# Patient Record
Sex: FEMALE | Race: WHITE | NOT HISPANIC OR LATINO | Employment: OTHER | URBAN - METROPOLITAN AREA
[De-identification: names, ages, dates, MRNs, and addresses within clinical notes are randomized per-mention and may not be internally consistent; named-entity substitution may affect disease eponyms.]

---

## 2017-01-27 ENCOUNTER — FOLLOW UP AND POST INJECTION EVALUATION (OUTPATIENT)
Dept: URBAN - METROPOLITAN AREA CLINIC 33 | Facility: CLINIC | Age: 77
End: 2017-01-27

## 2017-01-27 VITALS — HEIGHT: 55 IN | DIASTOLIC BLOOD PRESSURE: 91 MMHG | SYSTOLIC BLOOD PRESSURE: 128 MMHG | HEART RATE: 82 BPM

## 2017-01-27 DIAGNOSIS — H35.3221: ICD-10-CM

## 2017-01-27 DIAGNOSIS — H43.811: ICD-10-CM

## 2017-01-27 DIAGNOSIS — H04.123: ICD-10-CM

## 2017-01-27 DIAGNOSIS — H43.393: ICD-10-CM

## 2017-01-27 DIAGNOSIS — H26.492: ICD-10-CM

## 2017-01-27 DIAGNOSIS — H35.342: ICD-10-CM

## 2017-01-27 DIAGNOSIS — H02.836: ICD-10-CM

## 2017-01-27 DIAGNOSIS — H02.833: ICD-10-CM

## 2017-01-27 DIAGNOSIS — H35.3211: ICD-10-CM

## 2017-01-27 PROCEDURE — 92250 FUNDUS PHOTOGRAPHY W/I&R: CPT

## 2017-01-27 PROCEDURE — G8427 DOCREV CUR MEDS BY ELIG CLIN: HCPCS

## 2017-01-27 PROCEDURE — 4004F PT TOBACCO SCREEN RCVD TLK: CPT

## 2017-01-27 PROCEDURE — 2019F DILATED MACUL EXAM DONE: CPT

## 2017-01-27 PROCEDURE — G8420 CALC BMI NORM PARAMETERS: HCPCS

## 2017-01-27 PROCEDURE — 67028 INJECTION EYE DRUG: CPT

## 2017-01-27 PROCEDURE — 4177F TALK PT/CRGVR RE AREDS PREV: CPT

## 2017-01-27 PROCEDURE — 92014 COMPRE OPH EXAM EST PT 1/>: CPT

## 2017-01-27 ASSESSMENT — TONOMETRY
OD_IOP_MMHG: 14
OS_IOP_MMHG: 16

## 2017-01-27 ASSESSMENT — VISUAL ACUITY
OD_SC: 20/200-2
OS_SC: 20/60+2

## 2017-02-17 ENCOUNTER — CLINIC PROCEDURE ONLY (OUTPATIENT)
Dept: URBAN - METROPOLITAN AREA CLINIC 33 | Facility: CLINIC | Age: 77
End: 2017-02-17

## 2017-02-17 DIAGNOSIS — H35.3231: ICD-10-CM

## 2017-02-17 PROCEDURE — 67028 INJECTION EYE DRUG: CPT

## 2017-02-17 ASSESSMENT — TONOMETRY: OD_IOP_MMHG: 14

## 2017-02-17 ASSESSMENT — VISUAL ACUITY: OD_SC: 20/200-1

## 2017-03-10 ENCOUNTER — FOLLOW UP (OUTPATIENT)
Dept: URBAN - METROPOLITAN AREA CLINIC 33 | Facility: CLINIC | Age: 77
End: 2017-03-10

## 2017-03-10 VITALS — SYSTOLIC BLOOD PRESSURE: 124 MMHG | HEIGHT: 55 IN | DIASTOLIC BLOOD PRESSURE: 83 MMHG | HEART RATE: 69 BPM

## 2017-03-10 DIAGNOSIS — H02.833: ICD-10-CM

## 2017-03-10 DIAGNOSIS — H43.811: ICD-10-CM

## 2017-03-10 DIAGNOSIS — H35.342: ICD-10-CM

## 2017-03-10 DIAGNOSIS — H02.836: ICD-10-CM

## 2017-03-10 DIAGNOSIS — H04.123: ICD-10-CM

## 2017-03-10 DIAGNOSIS — H43.393: ICD-10-CM

## 2017-03-10 DIAGNOSIS — H35.3231: ICD-10-CM

## 2017-03-10 DIAGNOSIS — H26.492: ICD-10-CM

## 2017-03-10 PROCEDURE — 4004F PT TOBACCO SCREEN RCVD TLK: CPT

## 2017-03-10 PROCEDURE — 92014 COMPRE OPH EXAM EST PT 1/>: CPT

## 2017-03-10 PROCEDURE — 67028 INJECTION EYE DRUG: CPT

## 2017-03-10 PROCEDURE — 92250 FUNDUS PHOTOGRAPHY W/I&R: CPT

## 2017-03-10 PROCEDURE — 2019F DILATED MACUL EXAM DONE: CPT

## 2017-03-10 PROCEDURE — 4177F TALK PT/CRGVR RE AREDS PREV: CPT

## 2017-03-10 PROCEDURE — G8420 CALC BMI NORM PARAMETERS: HCPCS

## 2017-03-10 ASSESSMENT — VISUAL ACUITY
OS_SC: 20/60-3
OD_PH: 20/100-2
OD_SC: 20/200-1

## 2017-03-10 ASSESSMENT — TONOMETRY
OD_IOP_MMHG: 9
OS_IOP_MMHG: 12

## 2017-04-28 ENCOUNTER — FOLLOW UP (OUTPATIENT)
Dept: URBAN - METROPOLITAN AREA CLINIC 33 | Facility: CLINIC | Age: 77
End: 2017-04-28

## 2017-04-28 VITALS — HEIGHT: 55 IN | HEART RATE: 72 BPM | SYSTOLIC BLOOD PRESSURE: 119 MMHG | DIASTOLIC BLOOD PRESSURE: 70 MMHG

## 2017-04-28 DIAGNOSIS — H02.833: ICD-10-CM

## 2017-04-28 DIAGNOSIS — H35.342: ICD-10-CM

## 2017-04-28 DIAGNOSIS — H02.836: ICD-10-CM

## 2017-04-28 DIAGNOSIS — H43.811: ICD-10-CM

## 2017-04-28 DIAGNOSIS — H26.492: ICD-10-CM

## 2017-04-28 DIAGNOSIS — H04.123: ICD-10-CM

## 2017-04-28 DIAGNOSIS — H43.393: ICD-10-CM

## 2017-04-28 DIAGNOSIS — H35.3231: ICD-10-CM

## 2017-04-28 PROCEDURE — 92250 FUNDUS PHOTOGRAPHY W/I&R: CPT

## 2017-04-28 PROCEDURE — 2019F DILATED MACUL EXAM DONE: CPT

## 2017-04-28 PROCEDURE — G8427 DOCREV CUR MEDS BY ELIG CLIN: HCPCS

## 2017-04-28 PROCEDURE — 67028 INJECTION EYE DRUG: CPT

## 2017-04-28 PROCEDURE — 4177F TALK PT/CRGVR RE AREDS PREV: CPT

## 2017-04-28 PROCEDURE — 92014 COMPRE OPH EXAM EST PT 1/>: CPT

## 2017-04-28 ASSESSMENT — TONOMETRY
OD_IOP_MMHG: 13
OS_IOP_MMHG: 13

## 2017-04-28 ASSESSMENT — VISUAL ACUITY
OD_SC: 20/200+1
OS_SC: 20/100+2

## 2017-05-05 ENCOUNTER — CLINIC PROCEDURE ONLY (OUTPATIENT)
Dept: URBAN - METROPOLITAN AREA CLINIC 33 | Facility: CLINIC | Age: 77
End: 2017-05-05

## 2017-05-05 DIAGNOSIS — H35.3231: ICD-10-CM

## 2017-05-05 PROCEDURE — 67028 INJECTION EYE DRUG: CPT

## 2017-05-05 ASSESSMENT — VISUAL ACUITY: OS_SC: 20/80

## 2017-05-05 ASSESSMENT — TONOMETRY: OS_IOP_MMHG: 14

## 2017-06-09 ENCOUNTER — CLINIC PROCEDURE ONLY (OUTPATIENT)
Dept: URBAN - METROPOLITAN AREA CLINIC 33 | Facility: CLINIC | Age: 77
End: 2017-06-09

## 2017-06-09 DIAGNOSIS — H35.3231: ICD-10-CM

## 2017-06-09 PROCEDURE — 67028 INJECTION EYE DRUG: CPT

## 2017-06-09 ASSESSMENT — VISUAL ACUITY: OD_SC: 20/200

## 2017-06-09 ASSESSMENT — TONOMETRY: OD_IOP_MMHG: 12

## 2017-06-16 ENCOUNTER — CLINIC PROCEDURE ONLY (OUTPATIENT)
Dept: URBAN - METROPOLITAN AREA CLINIC 33 | Facility: CLINIC | Age: 77
End: 2017-06-16

## 2017-06-16 DIAGNOSIS — H35.3231: ICD-10-CM

## 2017-06-16 PROCEDURE — 67028 INJECTION EYE DRUG: CPT

## 2017-06-16 ASSESSMENT — TONOMETRY: OS_IOP_MMHG: 14

## 2017-06-16 ASSESSMENT — VISUAL ACUITY: OS_CC: 20/70-2

## 2017-07-28 ENCOUNTER — FOLLOW UP AND POST INJECTION EVALUATION (OUTPATIENT)
Dept: URBAN - METROPOLITAN AREA CLINIC 33 | Facility: CLINIC | Age: 77
End: 2017-07-28

## 2017-07-28 VITALS — HEIGHT: 55 IN | DIASTOLIC BLOOD PRESSURE: 101 MMHG | SYSTOLIC BLOOD PRESSURE: 149 MMHG | HEART RATE: 72 BPM

## 2017-07-28 DIAGNOSIS — H02.836: ICD-10-CM

## 2017-07-28 DIAGNOSIS — H35.342: ICD-10-CM

## 2017-07-28 DIAGNOSIS — H43.393: ICD-10-CM

## 2017-07-28 DIAGNOSIS — H02.833: ICD-10-CM

## 2017-07-28 DIAGNOSIS — H43.811: ICD-10-CM

## 2017-07-28 DIAGNOSIS — H04.123: ICD-10-CM

## 2017-07-28 DIAGNOSIS — H35.3231: ICD-10-CM

## 2017-07-28 DIAGNOSIS — H26.492: ICD-10-CM

## 2017-07-28 PROCEDURE — 92250 FUNDUS PHOTOGRAPHY W/I&R: CPT

## 2017-07-28 PROCEDURE — 4004F PT TOBACCO SCREEN RCVD TLK: CPT

## 2017-07-28 PROCEDURE — 92014 COMPRE OPH EXAM EST PT 1/>: CPT

## 2017-07-28 PROCEDURE — 2019F DILATED MACUL EXAM DONE: CPT

## 2017-07-28 PROCEDURE — 67028 INJECTION EYE DRUG: CPT

## 2017-07-28 PROCEDURE — 92235 FLUORESCEIN ANGRPH MLTIFRAME: CPT

## 2017-07-28 PROCEDURE — G8427 DOCREV CUR MEDS BY ELIG CLIN: HCPCS

## 2017-07-28 PROCEDURE — G8420 CALC BMI NORM PARAMETERS: HCPCS

## 2017-07-28 PROCEDURE — 4177F TALK PT/CRGVR RE AREDS PREV: CPT

## 2017-07-28 ASSESSMENT — VISUAL ACUITY
OS_SC: 20/70
OD_SC: CF 6FT

## 2017-07-28 ASSESSMENT — TONOMETRY
OS_IOP_MMHG: 12
OD_IOP_MMHG: 12

## 2017-08-04 ENCOUNTER — CLINIC PROCEDURE ONLY (OUTPATIENT)
Dept: URBAN - METROPOLITAN AREA CLINIC 33 | Facility: CLINIC | Age: 77
End: 2017-08-04

## 2017-08-04 DIAGNOSIS — H35.3231: ICD-10-CM

## 2017-08-04 PROCEDURE — 67028 INJECTION EYE DRUG: CPT

## 2017-08-04 ASSESSMENT — VISUAL ACUITY: OS_SC: 20/80

## 2017-08-04 ASSESSMENT — TONOMETRY: OS_IOP_MMHG: 12

## 2017-09-29 ENCOUNTER — CLINIC PROCEDURE ONLY (OUTPATIENT)
Dept: URBAN - METROPOLITAN AREA CLINIC 33 | Facility: CLINIC | Age: 77
End: 2017-09-29

## 2017-09-29 DIAGNOSIS — H35.3231: ICD-10-CM

## 2017-09-29 PROCEDURE — 67028 INJECTION EYE DRUG: CPT

## 2017-09-29 ASSESSMENT — TONOMETRY: OD_IOP_MMHG: 11

## 2017-09-29 ASSESSMENT — VISUAL ACUITY: OD_SC: 20/200-2

## 2017-10-06 ENCOUNTER — CLINIC PROCEDURE ONLY (OUTPATIENT)
Dept: URBAN - METROPOLITAN AREA CLINIC 33 | Facility: CLINIC | Age: 77
End: 2017-10-06

## 2017-10-06 DIAGNOSIS — H35.3231: ICD-10-CM

## 2017-10-06 PROCEDURE — 67028 INJECTION EYE DRUG: CPT

## 2017-10-06 ASSESSMENT — TONOMETRY: OS_IOP_MMHG: 14

## 2017-10-06 ASSESSMENT — VISUAL ACUITY: OS_SC: 20/80-

## 2017-11-10 ENCOUNTER — CLINIC PROCEDURE ONLY (OUTPATIENT)
Dept: URBAN - METROPOLITAN AREA CLINIC 33 | Facility: CLINIC | Age: 77
End: 2017-11-10

## 2017-11-10 DIAGNOSIS — H35.3231: ICD-10-CM

## 2017-11-10 PROCEDURE — 67028 INJECTION EYE DRUG: CPT

## 2017-11-10 ASSESSMENT — TONOMETRY: OD_IOP_MMHG: 15

## 2017-11-10 ASSESSMENT — VISUAL ACUITY: OD_SC: 20/200+2

## 2017-11-17 ENCOUNTER — CLINIC PROCEDURE ONLY (OUTPATIENT)
Dept: URBAN - METROPOLITAN AREA CLINIC 33 | Facility: CLINIC | Age: 77
End: 2017-11-17

## 2017-11-17 DIAGNOSIS — H35.3231: ICD-10-CM

## 2017-11-17 PROCEDURE — 67028 INJECTION EYE DRUG: CPT

## 2017-11-17 ASSESSMENT — TONOMETRY: OS_IOP_MMHG: 15

## 2017-11-17 ASSESSMENT — VISUAL ACUITY: OS_SC: 20/80

## 2017-12-22 ENCOUNTER — FOLLOW UP (OUTPATIENT)
Dept: URBAN - METROPOLITAN AREA CLINIC 33 | Facility: CLINIC | Age: 77
End: 2017-12-22

## 2017-12-22 VITALS — HEART RATE: 59 BPM | HEIGHT: 55 IN | SYSTOLIC BLOOD PRESSURE: 113 MMHG | DIASTOLIC BLOOD PRESSURE: 70 MMHG

## 2017-12-22 DIAGNOSIS — H43.393: ICD-10-CM

## 2017-12-22 DIAGNOSIS — H04.123: ICD-10-CM

## 2017-12-22 DIAGNOSIS — H35.3231: ICD-10-CM

## 2017-12-22 DIAGNOSIS — H43.811: ICD-10-CM

## 2017-12-22 DIAGNOSIS — H26.492: ICD-10-CM

## 2017-12-22 DIAGNOSIS — H02.836: ICD-10-CM

## 2017-12-22 DIAGNOSIS — H35.342: ICD-10-CM

## 2017-12-22 DIAGNOSIS — H02.833: ICD-10-CM

## 2017-12-22 PROCEDURE — 92014 COMPRE OPH EXAM EST PT 1/>: CPT

## 2017-12-22 PROCEDURE — 92134 CPTRZ OPH DX IMG PST SGM RTA: CPT

## 2017-12-22 PROCEDURE — 92250 FUNDUS PHOTOGRAPHY W/I&R: CPT

## 2017-12-22 PROCEDURE — 2019F DILATED MACUL EXAM DONE: CPT

## 2017-12-22 PROCEDURE — 4177F TALK PT/CRGVR RE AREDS PREV: CPT

## 2017-12-22 PROCEDURE — G8420 CALC BMI NORM PARAMETERS: HCPCS

## 2017-12-22 PROCEDURE — 4004F PT TOBACCO SCREEN RCVD TLK: CPT

## 2017-12-22 PROCEDURE — 67028 INJECTION EYE DRUG: CPT

## 2017-12-22 PROCEDURE — G8427 DOCREV CUR MEDS BY ELIG CLIN: HCPCS

## 2017-12-22 ASSESSMENT — VISUAL ACUITY
OD_SC: 20/200+2
OS_SC: 20/80-1

## 2017-12-22 ASSESSMENT — TONOMETRY
OD_IOP_MMHG: 11
OS_IOP_MMHG: 12

## 2017-12-29 ENCOUNTER — CLINIC PROCEDURE ONLY (OUTPATIENT)
Dept: URBAN - METROPOLITAN AREA CLINIC 33 | Facility: CLINIC | Age: 77
End: 2017-12-29

## 2017-12-29 DIAGNOSIS — H35.3231: ICD-10-CM

## 2017-12-29 PROCEDURE — 67028 INJECTION EYE DRUG: CPT

## 2017-12-29 ASSESSMENT — TONOMETRY: OS_IOP_MMHG: 14

## 2017-12-29 ASSESSMENT — VISUAL ACUITY: OS_SC: 20/60-

## 2018-02-16 ENCOUNTER — CLINIC PROCEDURE ONLY (OUTPATIENT)
Dept: URBAN - METROPOLITAN AREA CLINIC 33 | Facility: CLINIC | Age: 78
End: 2018-02-16

## 2018-02-16 DIAGNOSIS — H35.3231: ICD-10-CM

## 2018-02-16 PROCEDURE — 67028 INJECTION EYE DRUG: CPT

## 2018-02-16 ASSESSMENT — VISUAL ACUITY: OD_CC: 20/200

## 2018-02-16 ASSESSMENT — TONOMETRY: OD_IOP_MMHG: 15

## 2018-03-16 ENCOUNTER — CLINIC PROCEDURE ONLY (OUTPATIENT)
Dept: URBAN - METROPOLITAN AREA CLINIC 33 | Facility: CLINIC | Age: 78
End: 2018-03-16

## 2018-03-16 DIAGNOSIS — H35.3231: ICD-10-CM

## 2018-03-16 PROCEDURE — 67028 INJECTION EYE DRUG: CPT

## 2018-03-16 ASSESSMENT — TONOMETRY: OS_IOP_MMHG: 14

## 2018-03-16 ASSESSMENT — VISUAL ACUITY: OS_SC: 20/60-2

## 2018-04-27 ENCOUNTER — CLINIC PROCEDURE ONLY (OUTPATIENT)
Dept: URBAN - METROPOLITAN AREA CLINIC 33 | Facility: CLINIC | Age: 78
End: 2018-04-27

## 2018-04-27 DIAGNOSIS — H35.3231: ICD-10-CM

## 2018-04-27 PROCEDURE — 67028 INJECTION EYE DRUG: CPT

## 2018-04-27 ASSESSMENT — VISUAL ACUITY
OS_SC: 20/60-
OD_SC: 20/200

## 2018-04-27 ASSESSMENT — TONOMETRY
OD_IOP_MMHG: 16
OS_IOP_MMHG: 18

## 2018-05-04 ENCOUNTER — CLINIC PROCEDURE ONLY (OUTPATIENT)
Dept: URBAN - METROPOLITAN AREA CLINIC 33 | Facility: CLINIC | Age: 78
End: 2018-05-04

## 2018-05-04 DIAGNOSIS — H35.3231: ICD-10-CM

## 2018-05-04 PROCEDURE — 67028 INJECTION EYE DRUG: CPT

## 2018-05-04 ASSESSMENT — VISUAL ACUITY: OS_CC: 20/80

## 2018-05-04 ASSESSMENT — TONOMETRY: OS_IOP_MMHG: 14

## 2018-06-08 ENCOUNTER — FOLLOW UP AND POST INJECTION EVALUATION (OUTPATIENT)
Dept: URBAN - METROPOLITAN AREA CLINIC 33 | Facility: CLINIC | Age: 78
End: 2018-06-08

## 2018-06-08 VITALS
BODY MASS INDEX: 21.97 KG/M2 | WEIGHT: 140 LBS | HEART RATE: 79 BPM | SYSTOLIC BLOOD PRESSURE: 157 MMHG | DIASTOLIC BLOOD PRESSURE: 94 MMHG | HEIGHT: 67 IN

## 2018-06-08 DIAGNOSIS — H43.393: ICD-10-CM

## 2018-06-08 DIAGNOSIS — H02.836: ICD-10-CM

## 2018-06-08 DIAGNOSIS — H43.811: ICD-10-CM

## 2018-06-08 DIAGNOSIS — H35.342: ICD-10-CM

## 2018-06-08 DIAGNOSIS — H02.833: ICD-10-CM

## 2018-06-08 DIAGNOSIS — H35.3231: ICD-10-CM

## 2018-06-08 DIAGNOSIS — H04.123: ICD-10-CM

## 2018-06-08 DIAGNOSIS — H26.492: ICD-10-CM

## 2018-06-08 PROCEDURE — 67028 INJECTION EYE DRUG: CPT

## 2018-06-08 PROCEDURE — 92250 FUNDUS PHOTOGRAPHY W/I&R: CPT

## 2018-06-08 PROCEDURE — 92014 COMPRE OPH EXAM EST PT 1/>: CPT

## 2018-06-08 PROCEDURE — 92134 CPTRZ OPH DX IMG PST SGM RTA: CPT

## 2018-06-08 ASSESSMENT — VISUAL ACUITY
OD_SC: 20/100+2
OS_SC: 20/70+1

## 2018-06-08 ASSESSMENT — TONOMETRY
OD_IOP_MMHG: 15
OS_IOP_MMHG: 16

## 2018-06-15 ENCOUNTER — CLINIC PROCEDURE ONLY (OUTPATIENT)
Dept: URBAN - METROPOLITAN AREA CLINIC 33 | Facility: CLINIC | Age: 78
End: 2018-06-15

## 2018-06-15 DIAGNOSIS — H35.3231: ICD-10-CM

## 2018-06-15 PROCEDURE — 67028 INJECTION EYE DRUG: CPT

## 2018-06-15 ASSESSMENT — VISUAL ACUITY: OS_CC: 20/80-

## 2018-06-15 ASSESSMENT — TONOMETRY: OS_IOP_MMHG: 13

## 2018-07-20 ENCOUNTER — CLINICAL PROCEDURE AND DIAGNOSTIC TESTING ONLY (OUTPATIENT)
Dept: URBAN - METROPOLITAN AREA CLINIC 33 | Facility: CLINIC | Age: 78
End: 2018-07-20

## 2018-07-20 DIAGNOSIS — H35.3231: ICD-10-CM

## 2018-07-20 PROCEDURE — 92134 CPTRZ OPH DX IMG PST SGM RTA: CPT

## 2018-07-20 PROCEDURE — 67028 INJECTION EYE DRUG: CPT

## 2018-07-20 ASSESSMENT — TONOMETRY: OD_IOP_MMHG: 14

## 2018-07-20 ASSESSMENT — VISUAL ACUITY: OD_SC: 20/100

## 2018-07-27 ENCOUNTER — CLINIC PROCEDURE ONLY (OUTPATIENT)
Dept: URBAN - METROPOLITAN AREA CLINIC 33 | Facility: CLINIC | Age: 78
End: 2018-07-27

## 2018-07-27 DIAGNOSIS — H35.3231: ICD-10-CM

## 2018-07-27 PROCEDURE — 67028 INJECTION EYE DRUG: CPT

## 2018-07-27 ASSESSMENT — VISUAL ACUITY: OS_CC: 20/80

## 2018-07-27 ASSESSMENT — TONOMETRY: OS_IOP_MMHG: 12

## 2018-08-31 ENCOUNTER — CLINICAL PROCEDURE AND DIAGNOSTIC TESTING ONLY (OUTPATIENT)
Dept: URBAN - METROPOLITAN AREA CLINIC 33 | Facility: CLINIC | Age: 78
End: 2018-08-31

## 2018-08-31 DIAGNOSIS — H35.3231: ICD-10-CM

## 2018-08-31 PROCEDURE — 67028 INJECTION EYE DRUG: CPT

## 2018-08-31 PROCEDURE — 92134 CPTRZ OPH DX IMG PST SGM RTA: CPT

## 2018-08-31 ASSESSMENT — TONOMETRY: OD_IOP_MMHG: 10

## 2018-08-31 ASSESSMENT — VISUAL ACUITY: OD_SC: 20/200

## 2018-09-07 ENCOUNTER — CLINIC PROCEDURE ONLY (OUTPATIENT)
Dept: URBAN - METROPOLITAN AREA CLINIC 33 | Facility: CLINIC | Age: 78
End: 2018-09-07

## 2018-09-07 DIAGNOSIS — H35.3231: ICD-10-CM

## 2018-09-07 PROCEDURE — 67028 INJECTION EYE DRUG: CPT

## 2018-09-07 ASSESSMENT — TONOMETRY: OS_IOP_MMHG: 12

## 2018-09-07 ASSESSMENT — VISUAL ACUITY: OS_SC: 20/60-1

## 2018-10-12 ENCOUNTER — FOLLOW UP AND POST INJECTION EVALUATION (OUTPATIENT)
Dept: URBAN - METROPOLITAN AREA CLINIC 33 | Facility: CLINIC | Age: 78
End: 2018-10-12

## 2018-10-12 DIAGNOSIS — H43.393: ICD-10-CM

## 2018-10-12 DIAGNOSIS — H35.342: ICD-10-CM

## 2018-10-12 DIAGNOSIS — H04.123: ICD-10-CM

## 2018-10-12 DIAGNOSIS — H35.3231: ICD-10-CM

## 2018-10-12 DIAGNOSIS — H43.811: ICD-10-CM

## 2018-10-12 PROCEDURE — 92250 FUNDUS PHOTOGRAPHY W/I&R: CPT

## 2018-10-12 PROCEDURE — 67028 INJECTION EYE DRUG: CPT

## 2018-10-12 PROCEDURE — 92014 COMPRE OPH EXAM EST PT 1/>: CPT

## 2018-10-12 PROCEDURE — 92134 CPTRZ OPH DX IMG PST SGM RTA: CPT

## 2018-10-12 ASSESSMENT — VISUAL ACUITY
OD_SC: 20/200+1
OS_SC: 20/80+2

## 2018-10-12 ASSESSMENT — TONOMETRY
OD_IOP_MMHG: 14
OS_IOP_MMHG: 16

## 2018-10-19 ENCOUNTER — CLINIC PROCEDURE ONLY (OUTPATIENT)
Dept: URBAN - METROPOLITAN AREA CLINIC 33 | Facility: CLINIC | Age: 78
End: 2018-10-19

## 2018-10-19 DIAGNOSIS — H35.3231: ICD-10-CM

## 2018-10-19 PROCEDURE — 67028 INJECTION EYE DRUG: CPT

## 2018-10-19 ASSESSMENT — VISUAL ACUITY: OS_SC: 20/80

## 2018-10-19 ASSESSMENT — TONOMETRY: OS_IOP_MMHG: 17

## 2018-11-16 ENCOUNTER — CLINICAL PROCEDURE AND DIAGNOSTIC TESTING ONLY (OUTPATIENT)
Dept: URBAN - METROPOLITAN AREA CLINIC 33 | Facility: CLINIC | Age: 78
End: 2018-11-16

## 2018-11-16 DIAGNOSIS — H35.3231: ICD-10-CM

## 2018-11-16 PROCEDURE — 67028 INJECTION EYE DRUG: CPT

## 2018-11-16 PROCEDURE — 92134 CPTRZ OPH DX IMG PST SGM RTA: CPT

## 2018-11-16 ASSESSMENT — VISUAL ACUITY: OD_SC: 20/200-1

## 2018-11-16 ASSESSMENT — TONOMETRY: OD_IOP_MMHG: 14

## 2018-12-21 ENCOUNTER — CLINICAL PROCEDURE AND DIAGNOSTIC TESTING ONLY (OUTPATIENT)
Dept: URBAN - METROPOLITAN AREA CLINIC 33 | Facility: CLINIC | Age: 78
End: 2018-12-21

## 2018-12-21 DIAGNOSIS — H35.3231: ICD-10-CM

## 2018-12-21 PROCEDURE — 92134 CPTRZ OPH DX IMG PST SGM RTA: CPT

## 2018-12-21 PROCEDURE — 67028 INJECTION EYE DRUG: CPT

## 2018-12-21 ASSESSMENT — TONOMETRY: OS_IOP_MMHG: 15

## 2018-12-21 ASSESSMENT — VISUAL ACUITY: OS_SC: 20/80-

## 2019-01-11 ENCOUNTER — CLINICAL PROCEDURE AND DIAGNOSTIC TESTING ONLY (OUTPATIENT)
Dept: URBAN - METROPOLITAN AREA CLINIC 33 | Facility: CLINIC | Age: 79
End: 2019-01-11

## 2019-01-11 DIAGNOSIS — H35.3231: ICD-10-CM

## 2019-01-11 PROCEDURE — 67028 INJECTION EYE DRUG: CPT

## 2019-01-11 PROCEDURE — 92134 CPTRZ OPH DX IMG PST SGM RTA: CPT

## 2019-01-11 ASSESSMENT — TONOMETRY: OD_IOP_MMHG: 13

## 2019-01-18 ENCOUNTER — CLINIC PROCEDURE ONLY (OUTPATIENT)
Dept: URBAN - METROPOLITAN AREA CLINIC 33 | Facility: CLINIC | Age: 79
End: 2019-01-18

## 2019-01-18 DIAGNOSIS — H35.3231: ICD-10-CM

## 2019-01-18 PROCEDURE — 67028 INJECTION EYE DRUG: CPT

## 2019-01-18 ASSESSMENT — TONOMETRY: OS_IOP_MMHG: 14

## 2019-01-18 ASSESSMENT — VISUAL ACUITY: OS_SC: 20/80

## 2019-02-15 ENCOUNTER — FOLLOW UP AND POST INJECTION EVALUATION (OUTPATIENT)
Dept: URBAN - METROPOLITAN AREA CLINIC 33 | Facility: CLINIC | Age: 79
End: 2019-02-15

## 2019-02-15 DIAGNOSIS — H43.393: ICD-10-CM

## 2019-02-15 DIAGNOSIS — H02.836: ICD-10-CM

## 2019-02-15 DIAGNOSIS — H35.342: ICD-10-CM

## 2019-02-15 DIAGNOSIS — H35.3231: ICD-10-CM

## 2019-02-15 DIAGNOSIS — H04.123: ICD-10-CM

## 2019-02-15 DIAGNOSIS — H02.833: ICD-10-CM

## 2019-02-15 DIAGNOSIS — H43.811: ICD-10-CM

## 2019-02-15 PROCEDURE — 92250 FUNDUS PHOTOGRAPHY W/I&R: CPT

## 2019-02-15 PROCEDURE — 67028 INJECTION EYE DRUG: CPT

## 2019-02-15 PROCEDURE — 92014 COMPRE OPH EXAM EST PT 1/>: CPT

## 2019-02-15 PROCEDURE — 92134 CPTRZ OPH DX IMG PST SGM RTA: CPT

## 2019-02-15 ASSESSMENT — VISUAL ACUITY
OD_SC: 20/200-1
OS_SC: 20/80

## 2019-02-15 ASSESSMENT — TONOMETRY
OS_IOP_MMHG: 13
OD_IOP_MMHG: 14

## 2019-03-01 ENCOUNTER — CLINIC PROCEDURE ONLY (OUTPATIENT)
Dept: URBAN - METROPOLITAN AREA CLINIC 33 | Facility: CLINIC | Age: 79
End: 2019-03-01

## 2019-03-01 DIAGNOSIS — H35.3231: ICD-10-CM

## 2019-03-01 PROCEDURE — 67028 INJECTION EYE DRUG: CPT

## 2019-03-01 ASSESSMENT — VISUAL ACUITY: OS_SC: 20/80

## 2019-03-01 ASSESSMENT — TONOMETRY: OS_IOP_MMHG: 12

## 2019-03-29 ENCOUNTER — CLINICAL PROCEDURE AND DIAGNOSTIC TESTING ONLY (OUTPATIENT)
Dept: URBAN - METROPOLITAN AREA CLINIC 33 | Facility: CLINIC | Age: 79
End: 2019-03-29

## 2019-03-29 DIAGNOSIS — H35.3231: ICD-10-CM

## 2019-03-29 PROCEDURE — 92134 CPTRZ OPH DX IMG PST SGM RTA: CPT

## 2019-03-29 PROCEDURE — 67028 INJECTION EYE DRUG: CPT

## 2019-03-29 ASSESSMENT — TONOMETRY: OD_IOP_MMHG: 10

## 2019-03-29 ASSESSMENT — VISUAL ACUITY: OD_SC: 20/200

## 2019-04-05 ENCOUNTER — CLINIC PROCEDURE ONLY (OUTPATIENT)
Dept: URBAN - METROPOLITAN AREA CLINIC 33 | Facility: CLINIC | Age: 79
End: 2019-04-05

## 2019-04-05 DIAGNOSIS — H35.3231: ICD-10-CM

## 2019-04-05 PROCEDURE — 67028 INJECTION EYE DRUG: CPT

## 2019-04-05 ASSESSMENT — VISUAL ACUITY: OS_SC: 20/50-2

## 2019-04-05 ASSESSMENT — TONOMETRY: OS_IOP_MMHG: 15

## 2019-05-03 ENCOUNTER — CLINICAL PROCEDURE AND DIAGNOSTIC TESTING ONLY (OUTPATIENT)
Dept: URBAN - METROPOLITAN AREA CLINIC 33 | Facility: CLINIC | Age: 79
End: 2019-05-03

## 2019-05-03 DIAGNOSIS — H35.3231: ICD-10-CM

## 2019-05-03 PROCEDURE — 67028 INJECTION EYE DRUG: CPT

## 2019-05-03 PROCEDURE — 92134 CPTRZ OPH DX IMG PST SGM RTA: CPT

## 2019-05-03 ASSESSMENT — TONOMETRY: OD_IOP_MMHG: 12

## 2019-05-03 ASSESSMENT — VISUAL ACUITY: OD_SC: 20/200-1

## 2019-05-17 ENCOUNTER — PROCEDURE ONLY (OUTPATIENT)
Dept: URBAN - METROPOLITAN AREA CLINIC 33 | Facility: CLINIC | Age: 79
End: 2019-05-17

## 2019-05-17 DIAGNOSIS — H35.3231: ICD-10-CM

## 2019-05-17 PROCEDURE — 67028 INJECTION EYE DRUG: CPT

## 2019-05-17 ASSESSMENT — VISUAL ACUITY: OS_SC: 20/50-2

## 2019-05-17 ASSESSMENT — TONOMETRY: OD_IOP_MMHG: 14

## 2019-06-07 ENCOUNTER — CLINICAL PROCEDURE AND DIAGNOSTIC TESTING ONLY (OUTPATIENT)
Dept: URBAN - METROPOLITAN AREA CLINIC 33 | Facility: CLINIC | Age: 79
End: 2019-06-07

## 2019-06-07 DIAGNOSIS — H35.3231: ICD-10-CM

## 2019-06-07 PROCEDURE — 92134 CPTRZ OPH DX IMG PST SGM RTA: CPT

## 2019-06-07 PROCEDURE — 67028 INJECTION EYE DRUG: CPT

## 2019-06-07 ASSESSMENT — TONOMETRY: OD_IOP_MMHG: 12

## 2019-06-07 ASSESSMENT — VISUAL ACUITY: OD_SC: 20/100

## 2019-06-11 NOTE — PATIENT DISCUSSION
Moderate DME present on today's exam. Will start series of 3 injections today being 1/3. Will treat & see back in Q4W. Eylea sample used today, will switch to Lucentis 0.3 at next visit.

## 2019-06-11 NOTE — PATIENT DISCUSSION
IOP elevated, Will plan for Tube with [address] when blood pressure goes down.

## 2019-06-11 NOTE — PROCEDURE NOTE: CLINICAL
PROCEDURE NOTE: Eylea Sample OD. Diagnosis: Diabetic Macular Edema. Prior to injection, risks/benefits/alternatives discussed including corneal abrasion, infection, loss of vision, hemorrhage, cataract, glaucoma, retinal tears or detachment. A written consent is on file, and the need for today’s injection was discussed and the patient is understanding and wishes to proceed. The entire vial of Eylea was drawn up into a syringe. The opened vial, remaining drug, and filtered needle were disposed of in a certified biohazard container. Betadine prep was performed. Topical anesthesia was induced with Alcaine. 4% lidocaine pledge. A lid speculum was used. A short 30g needle on a 1cc syringe was used with product that that had previously been prepared under sterile conditions. Injection site: 3-4 mm from the limbus. The used syringe/needle was transferred to a biohazard container. Lid speculum removed. Mask worn during procedure. Patient tolerated procedure well. Count fingers vision was verified. There were no complications. Patient was given the standard instruction sheet. Patient given office phone number/answering service number and advised to call immediately should there be loss of vision or pain, or should they have any other questions or concerns. [facility] PROCEDURE NOTE: Eylea Sample OS. Diagnosis: Diabetic Macular Edema. Prior to injection, risks/benefits/alternatives discussed including corneal abrasion, infection, loss of vision, hemorrhage, cataract, glaucoma, retinal tears or detachment. A written consent is on file, and the need for today’s injection was discussed and the patient is understanding and wishes to proceed. The entire vial of Eylea was drawn up into a syringe. The opened vial, remaining drug, and filtered needle were disposed of in a certified biohazard container. Betadine prep was performed. Topical anesthesia was induced with Alcaine. 4% lidocaine pledge. A lid speculum was used. A short 30g needle on a 1cc syringe was used with product that that had previously been prepared under sterile conditions. Injection site: 3-4 mm from the limbus. The used syringe/needle was transferred to a biohazard container. Lid speculum removed. Mask worn during procedure. Patient tolerated procedure well. Count fingers vision was verified. There were no complications. Patient was given the standard instruction sheet. Patient given office phone number/answering service number and advised to call immediately should there be loss of vision or pain, or should they have any other questions or concerns. Jamia Andrew PROCEDURE NOTE: A/C Paracentesis OS. Diagnosis: Diabetic Macular Edema. Prior to treatment, the risks/benefits/alternatives were discussed. The patient wished to proceed with procedure. Location of Tap: Temporal Limbus. The eye was prepped with topical Betadine 5%, a sterile lid speculum was placed in the eye. Volume of tap: 0.1 ml. Patient tolerated procedure well. There were no complications. Post procedure instructions given. Patient given office phone number/answering service number and advised to call immediately should there be an increase in floaters or redness, loss of vision or pain, or should they have any other questions or concerns. Jamia Andrew

## 2019-06-11 NOTE — PROCEDURE NOTE: CLINICAL
PROCEDURE NOTE: Eylea Sample OD. Diagnosis: Diabetic Macular Edema. Prior to injection, risks/benefits/alternatives discussed including corneal abrasion, infection, loss of vision, hemorrhage, cataract, glaucoma, retinal tears or detachment. A written consent is on file, and the need for today’s injection was discussed and the patient is understanding and wishes to proceed. The entire vial of Eylea was drawn up into a syringe. The opened vial, remaining drug, and filtered needle were disposed of in a certified biohazard container. Betadine prep was performed. Topical anesthesia was induced with Alcaine. 4% lidocaine pledge. A lid speculum was used. A short 30g needle on a 1cc syringe was used with product that that had previously been prepared under sterile conditions. Injection site: 3-4 mm from the limbus. The used syringe/needle was transferred to a biohazard container. Lid speculum removed. Mask worn during procedure. Patient tolerated procedure well. Count fingers vision was verified. There were no complications. Patient was given the standard instruction sheet. Patient given office phone number/answering service number and advised to call immediately should there be loss of vision or pain, or should they have any other questions or concerns. [facility] PROCEDURE NOTE: Eylea Sample OS. Diagnosis: Diabetic Macular Edema. Prior to injection, risks/benefits/alternatives discussed including corneal abrasion, infection, loss of vision, hemorrhage, cataract, glaucoma, retinal tears or detachment. A written consent is on file, and the need for today’s injection was discussed and the patient is understanding and wishes to proceed. The entire vial of Eylea was drawn up into a syringe. The opened vial, remaining drug, and filtered needle were disposed of in a certified biohazard container. Betadine prep was performed. Topical anesthesia was induced with Alcaine. 4% lidocaine pledge. A lid speculum was used. A short 30g needle on a 1cc syringe was used with product that that had previously been prepared under sterile conditions. Injection site: 3-4 mm from the limbus. The used syringe/needle was transferred to a biohazard container. Lid speculum removed. Mask worn during procedure. Patient tolerated procedure well. Count fingers vision was verified. There were no complications. Patient was given the standard instruction sheet. Patient given office phone number/answering service number and advised to call immediately should there be loss of vision or pain, or should they have any other questions or concerns. Jamia Andrew PROCEDURE NOTE: A/C Paracentesis OS. Diagnosis: Diabetic Macular Edema. Prior to treatment, the risks/benefits/alternatives were discussed. The patient wished to proceed with procedure. Location of Tap: Temporal Limbus. The eye was prepped with topical Betadine 5%, a sterile lid speculum was placed in the eye. Volume of tap: 0.1 ml. Patient tolerated procedure well. There were no complications. Post procedure instructions given. Patient given office phone number/answering service number and advised to call immediately should there be an increase in floaters or redness, loss of vision or pain, or should they have any other questions or concerns. Jamia Andrew

## 2019-06-14 NOTE — PROCEDURE NOTE: CLINICAL
PROCEDURE NOTE: PRP OS. Diagnosis: Proliferative Diabetic Retinopathy. Anesthesia: Topical. Prior to PRP, risks/benefits/alternatives to laser discussed including loss of vision, decreased peripheral and night vision and patient wished to proceed. Spot size: 200 um. Power: 280 mW. Number of pulses: 680. Patient tolerated procedure well. There were no complications. Post procedure instructions given. Andrey Stauffer

## 2019-06-14 NOTE — PATIENT DISCUSSION
IOP elevated, Will plan for Tube with [address] [facility] when blood pressure goes down.

## 2019-06-14 NOTE — PATIENT DISCUSSION
Moderate DME present on today's exam. Will start series of 3 injections today being 1/3. Will treat & see back in Q4W. Eylea sample used today, will switch to Lucentis 0.3 at next visit. AC tap done after injection, IOP soft after injection.

## 2019-06-21 NOTE — PATIENT DISCUSSION
REC: LOWER IOP OU, RESTART ALL GLAUCOMA DROPS, START LATANOPROST OU QHS, DORZOLAMIDE/TIMOLOL OU BID. IF IOP NOT LOW ENOUGH, THEN ADD BRIMONIDINE. MAY NEED GLAUCOMA SX IN THE FUTURE.

## 2019-06-28 ENCOUNTER — CLINICAL PROCEDURE AND DIAGNOSTIC TESTING ONLY (OUTPATIENT)
Dept: URBAN - METROPOLITAN AREA CLINIC 33 | Facility: CLINIC | Age: 79
End: 2019-06-28

## 2019-06-28 DIAGNOSIS — H35.3231: ICD-10-CM

## 2019-06-28 PROCEDURE — 67028 INJECTION EYE DRUG: CPT

## 2019-06-28 ASSESSMENT — VISUAL ACUITY: OS_CC: 20/50-2

## 2019-06-28 ASSESSMENT — TONOMETRY: OS_IOP_MMHG: 12

## 2019-07-16 NOTE — PATIENT DISCUSSION
Slightly Decreased Edema present on today's exam. Will start series of 3 injections today being 2/3. Will treat & see back in Q4W. Eylea sample used today, will switch to Lucentis 0.3 at next visit.

## 2019-07-16 NOTE — PROCEDURE NOTE: CLINICAL
PROCEDURE NOTE: Lucentis 0.3mg The patient was identified visually and verbally by the  surgeon. The procedure eye was marked with an adhesive arrow sticker. The  risks, benefits, alternatives and possible complications of the procedure  were discussed with the patient and informed consent was obtained. The  patient was positioned in the chair. After numerous topical anesthetic  drops were placed, subconjunctival lidocaine was administered. A speculum  was used to hold the eyelid open. A caliper was used to marked the site of  injection 3.5-4mm from the limbus. Betadine was placed on the site with a  swab and allowed to sit for thirty seconds. A sterile 30 or 31 guage needle with the  medication entered the vitreous cavity and the medication was  administered. The speculum was removed. The eye was copiously rinsed with  saline to remove all betadine, especially from the fornices. Gross vision  was assessed. If the patient wished an antibiotic ointment and eye patch  were applied. The patient was instructed to call immediately if any  significant visual loss, swelling, discharge, or pain occurred Intravitreal injection of Lucentis 0.3mg/0.05 ml was given. Patient tolerated the procedure well. There were no complications. CF vision checked. Post procedure instructions given. Andrey Stauffer

## 2019-07-16 NOTE — PATIENT DISCUSSION
Slightly Decreased Edema present on today's exam. Will hold injections today. Have pt return in 4 weeks for repeat injection.

## 2019-07-19 ENCOUNTER — FOLLOW UP AND POST INJECTION EVALUATION (OUTPATIENT)
Dept: URBAN - METROPOLITAN AREA CLINIC 33 | Facility: CLINIC | Age: 79
End: 2019-07-19

## 2019-07-19 DIAGNOSIS — H43.393: ICD-10-CM

## 2019-07-19 DIAGNOSIS — H35.3231: ICD-10-CM

## 2019-07-19 DIAGNOSIS — H02.836: ICD-10-CM

## 2019-07-19 DIAGNOSIS — Z96.1: ICD-10-CM

## 2019-07-19 DIAGNOSIS — H43.811: ICD-10-CM

## 2019-07-19 DIAGNOSIS — H02.833: ICD-10-CM

## 2019-07-19 DIAGNOSIS — H35.342: ICD-10-CM

## 2019-07-19 DIAGNOSIS — H04.123: ICD-10-CM

## 2019-07-19 PROCEDURE — 67028 INJECTION EYE DRUG: CPT

## 2019-07-19 PROCEDURE — 92134 CPTRZ OPH DX IMG PST SGM RTA: CPT

## 2019-07-19 PROCEDURE — 92014 COMPRE OPH EXAM EST PT 1/>: CPT

## 2019-07-19 PROCEDURE — 92250 FUNDUS PHOTOGRAPHY W/I&R: CPT

## 2019-07-19 ASSESSMENT — VISUAL ACUITY
OS_SC: 20/60
OD_SC: 20/100-2

## 2019-07-19 ASSESSMENT — TONOMETRY
OS_IOP_MMHG: 10
OD_IOP_MMHG: 15

## 2019-07-19 NOTE — PROCEDURE NOTE: CLINICAL
PROCEDURE NOTE: PRP OS. Diagnosis: Tractional Retinal Detachment. Anesthesia: Topical. Prior to PRP, risks/benefits/alternatives to laser discussed including loss of vision, decreased peripheral and night vision and patient wished to proceed. Spot size: 100 um. Power: 800 mW. Number of pulses: 240. Patient tolerated procedure well. There were no complications. Post procedure instructions given. Rach Goldsmith

## 2019-08-09 ENCOUNTER — CLINIC PROCEDURE ONLY (OUTPATIENT)
Dept: URBAN - METROPOLITAN AREA CLINIC 33 | Facility: CLINIC | Age: 79
End: 2019-08-09

## 2019-08-09 DIAGNOSIS — H35.3231: ICD-10-CM

## 2019-08-09 PROCEDURE — 67028 INJECTION EYE DRUG: CPT

## 2019-08-09 ASSESSMENT — TONOMETRY: OS_IOP_MMHG: 15

## 2019-08-09 ASSESSMENT — VISUAL ACUITY: OS_SC: 20/60

## 2019-08-13 NOTE — PATIENT DISCUSSION
Increased Edema present on today's exam. Will do Lucentis injection today, if no improvement, will switch to [facility].

## 2019-08-13 NOTE — PROCEDURE NOTE: CLINICAL
PROCEDURE NOTE: Lucentis 0.3mg OD. Diagnosis: Diabetic Macular Edema. The patient was identified visually and verbally by the  surgeon. The procedure eye was marked with an adhesive arrow sticker. The  risks, benefits, alternatives and possible complications of the procedure  were discussed with the patient and informed consent was obtained. The  patient was positioned in the chair. After numerous topical anesthetic  drops were placed, subconjunctival lidocaine was administered. A speculum  was used to hold the eyelid open. A caliper was used to marked the site of  injection 3.5-4mm from the limbus. Betadine was placed on the site with a  swab and allowed to sit for thirty seconds. A sterile 30 or 31 guage needle with the  medication entered the vitreous cavity and the medication was  administered. The speculum was removed. The eye was copiously rinsed with  saline to remove all betadine, especially from the fornices. Gross vision  was assessed. If the patient wished an antibiotic ointment and eye patch  were applied. The patient was instructed to call immediately if any  significant visual loss, swelling, discharge, or pain occurred Intravitreal injection of Lucentis 0.3mg/0.05 ml was given. Patient tolerated the procedure well. There were no complications. CF vision checked. Post procedure instructions given. Arnold Guidry

## 2019-08-30 ENCOUNTER — CLINICAL PROCEDURE AND DIAGNOSTIC TESTING ONLY (OUTPATIENT)
Dept: URBAN - METROPOLITAN AREA CLINIC 33 | Facility: CLINIC | Age: 79
End: 2019-08-30

## 2019-08-30 VITALS — HEIGHT: 67.5 IN | BODY MASS INDEX: 21.41 KG/M2 | WEIGHT: 138 LBS

## 2019-08-30 DIAGNOSIS — H35.3231: ICD-10-CM

## 2019-08-30 PROCEDURE — 92134 CPTRZ OPH DX IMG PST SGM RTA: CPT

## 2019-08-30 PROCEDURE — 67028 INJECTION EYE DRUG: CPT

## 2019-08-30 ASSESSMENT — TONOMETRY: OD_IOP_MMHG: 10

## 2019-08-30 ASSESSMENT — VISUAL ACUITY: OD_SC: 20/200+2

## 2019-09-13 ENCOUNTER — CLINIC PROCEDURE ONLY (OUTPATIENT)
Dept: URBAN - METROPOLITAN AREA CLINIC 33 | Facility: CLINIC | Age: 79
End: 2019-09-13

## 2019-09-13 DIAGNOSIS — H35.3231: ICD-10-CM

## 2019-09-13 PROCEDURE — 67028 INJECTION EYE DRUG: CPT

## 2019-09-13 ASSESSMENT — TONOMETRY: OS_IOP_MMHG: 13

## 2019-09-13 ASSESSMENT — VISUAL ACUITY: OS_SC: 20/60

## 2019-09-13 NOTE — PATIENT DISCUSSION
Slightly decreased edema, improved. Will switch to [facility] today. Based on today’s exam, diagnostic studies, and/or review of records, the determination was made for treatment today.

## 2019-09-13 NOTE — PROCEDURE NOTE: CLINICAL
PROCEDURE NOTE: Eylea 2mg OD. Diagnosis: Diabetic Macular Edema. Prior to injection, risks/benefits/alternatives discussed including corneal abrasion, infection, loss of vision, hemorrhage, cataract, glaucoma, retinal tears or detachment. A written consent is on file, and the need for today’s injection was discussed and the patient is understanding and wishes to proceed. The entire vial of Eylea was drawn up into a syringe. The opened vial, remaining drug, and filtered needle were disposed of in a certified biohazard container. Betadine prep was performed. Topical anesthesia was induced with Alcaine. 4% lidocaine pledge. A lid speculum was used. A short 30g needle on a 1cc syringe was used with product that that had previously been prepared under sterile conditions. Injection site: 3-4 mm from the limbus. The used syringe/needle was transferred to a biohazard container. Lid speculum removed. Mask worn during procedure. Patient tolerated procedure well. Count fingers vision was verified. There were no complications. Patient was given the standard instruction sheet. Patient given office phone number/answering service number and advised to call immediately should there be loss of vision or pain, or should they have any other questions or concerns. Renee Pruitt

## 2019-10-04 ENCOUNTER — CLINICAL PROCEDURE AND DIAGNOSTIC TESTING ONLY (OUTPATIENT)
Dept: URBAN - METROPOLITAN AREA CLINIC 33 | Facility: CLINIC | Age: 79
End: 2019-10-04

## 2019-10-04 DIAGNOSIS — H35.3231: ICD-10-CM

## 2019-10-04 PROCEDURE — 92134 CPTRZ OPH DX IMG PST SGM RTA: CPT

## 2019-10-04 PROCEDURE — 67028 INJECTION EYE DRUG: CPT

## 2019-10-04 ASSESSMENT — TONOMETRY: OD_IOP_MMHG: 13

## 2019-10-04 ASSESSMENT — VISUAL ACUITY: OD_SC: 20/200

## 2019-10-18 ENCOUNTER — PROCEDURE ONLY (OUTPATIENT)
Dept: URBAN - METROPOLITAN AREA CLINIC 33 | Facility: CLINIC | Age: 79
End: 2019-10-18

## 2019-10-18 DIAGNOSIS — H35.3231: ICD-10-CM

## 2019-10-18 PROCEDURE — 67028 INJECTION EYE DRUG: CPT

## 2019-10-18 ASSESSMENT — TONOMETRY: OS_IOP_MMHG: 17

## 2019-10-18 ASSESSMENT — VISUAL ACUITY: OS_SC: 20/60-1

## 2019-10-29 NOTE — PATIENT DISCUSSION
Decreased edema, improved, patient to return in 2 weeks for treatment only.  Due to work issues refuses injection today.

## 2019-10-29 NOTE — PATIENT DISCUSSION
Not well controlled.  Follow up in 1 week with [address] [place] [address] for possible tubes.

## 2019-11-08 ENCOUNTER — CLINICAL PROCEDURE AND DIAGNOSTIC TESTING ONLY (OUTPATIENT)
Dept: URBAN - METROPOLITAN AREA CLINIC 33 | Facility: CLINIC | Age: 79
End: 2019-11-08

## 2019-11-08 DIAGNOSIS — H35.3231: ICD-10-CM

## 2019-11-08 PROCEDURE — 92134 CPTRZ OPH DX IMG PST SGM RTA: CPT

## 2019-11-08 PROCEDURE — 67028 INJECTION EYE DRUG: CPT

## 2019-11-08 ASSESSMENT — TONOMETRY: OD_IOP_MMHG: 13

## 2019-11-08 ASSESSMENT — VISUAL ACUITY: OD_SC: 20/200-1

## 2019-11-22 ENCOUNTER — CLINIC PROCEDURE ONLY (OUTPATIENT)
Dept: URBAN - METROPOLITAN AREA CLINIC 33 | Facility: CLINIC | Age: 79
End: 2019-11-22

## 2019-11-22 DIAGNOSIS — H35.3231: ICD-10-CM

## 2019-11-22 PROCEDURE — 67028 INJECTION EYE DRUG: CPT

## 2019-11-22 ASSESSMENT — VISUAL ACUITY: OS_SC: 20/60+2

## 2019-11-22 ASSESSMENT — TONOMETRY: OS_IOP_MMHG: 15

## 2019-12-13 ENCOUNTER — FOLLOW UP AND POST INJECTION EVALUATION (OUTPATIENT)
Dept: URBAN - METROPOLITAN AREA CLINIC 33 | Facility: CLINIC | Age: 79
End: 2019-12-13

## 2019-12-13 VITALS — WEIGHT: 138 LBS | HEIGHT: 67 IN | BODY MASS INDEX: 21.66 KG/M2

## 2019-12-13 DIAGNOSIS — H35.342: ICD-10-CM

## 2019-12-13 DIAGNOSIS — H02.833: ICD-10-CM

## 2019-12-13 DIAGNOSIS — H43.393: ICD-10-CM

## 2019-12-13 DIAGNOSIS — Z96.1: ICD-10-CM

## 2019-12-13 DIAGNOSIS — H02.836: ICD-10-CM

## 2019-12-13 DIAGNOSIS — H35.3231: ICD-10-CM

## 2019-12-13 DIAGNOSIS — H04.123: ICD-10-CM

## 2019-12-13 DIAGNOSIS — H43.811: ICD-10-CM

## 2019-12-13 PROCEDURE — 92250 FUNDUS PHOTOGRAPHY W/I&R: CPT

## 2019-12-13 PROCEDURE — 92014 COMPRE OPH EXAM EST PT 1/>: CPT

## 2019-12-13 PROCEDURE — 67028 INJECTION EYE DRUG: CPT

## 2019-12-13 PROCEDURE — 92134 CPTRZ OPH DX IMG PST SGM RTA: CPT

## 2019-12-13 ASSESSMENT — VISUAL ACUITY
OS_SC: 20/60-
OD_SC: 20/100
OS_PH: 20/50-

## 2019-12-13 ASSESSMENT — TONOMETRY
OD_IOP_MMHG: 12
OS_IOP_MMHG: 12

## 2020-01-03 ENCOUNTER — CLINIC PROCEDURE ONLY (OUTPATIENT)
Dept: URBAN - METROPOLITAN AREA CLINIC 33 | Facility: CLINIC | Age: 80
End: 2020-01-03

## 2020-01-03 DIAGNOSIS — H35.3231: ICD-10-CM

## 2020-01-03 PROCEDURE — 67028 INJECTION EYE DRUG: CPT

## 2020-01-03 ASSESSMENT — VISUAL ACUITY: OS_SC: 20/50+1

## 2020-01-03 ASSESSMENT — TONOMETRY: OS_IOP_MMHG: 17

## 2020-01-24 ENCOUNTER — CLINICAL PROCEDURE AND DIAGNOSTIC TESTING ONLY (OUTPATIENT)
Dept: URBAN - METROPOLITAN AREA CLINIC 33 | Facility: CLINIC | Age: 80
End: 2020-01-24

## 2020-01-24 DIAGNOSIS — H35.3231: ICD-10-CM

## 2020-01-24 PROCEDURE — 67028 INJECTION EYE DRUG: CPT

## 2020-01-24 PROCEDURE — 92134 CPTRZ OPH DX IMG PST SGM RTA: CPT

## 2020-01-24 ASSESSMENT — TONOMETRY: OD_IOP_MMHG: 12

## 2020-01-24 ASSESSMENT — VISUAL ACUITY: OD_SC: 20/100

## 2020-02-07 ENCOUNTER — CLINIC PROCEDURE ONLY (OUTPATIENT)
Dept: URBAN - METROPOLITAN AREA CLINIC 33 | Facility: CLINIC | Age: 80
End: 2020-02-07

## 2020-02-07 DIAGNOSIS — H35.3231: ICD-10-CM

## 2020-02-07 PROCEDURE — 67028 INJECTION EYE DRUG: CPT

## 2020-02-07 ASSESSMENT — TONOMETRY: OS_IOP_MMHG: 10

## 2020-02-07 ASSESSMENT — VISUAL ACUITY: OS_SC: 20/50+2

## 2020-03-06 ENCOUNTER — CLINICAL PROCEDURE AND DIAGNOSTIC TESTING ONLY (OUTPATIENT)
Dept: URBAN - METROPOLITAN AREA CLINIC 33 | Facility: CLINIC | Age: 80
End: 2020-03-06

## 2020-03-06 DIAGNOSIS — H35.3231: ICD-10-CM

## 2020-03-06 PROCEDURE — 92134 CPTRZ OPH DX IMG PST SGM RTA: CPT

## 2020-03-06 PROCEDURE — 67028 INJECTION EYE DRUG: CPT

## 2020-03-06 ASSESSMENT — TONOMETRY: OD_IOP_MMHG: 10

## 2020-03-06 ASSESSMENT — VISUAL ACUITY: OD_SC: 20/200+2

## 2020-03-13 ENCOUNTER — CLINIC PROCEDURE ONLY (OUTPATIENT)
Dept: URBAN - METROPOLITAN AREA CLINIC 33 | Facility: CLINIC | Age: 80
End: 2020-03-13

## 2020-03-13 DIAGNOSIS — H35.3231: ICD-10-CM

## 2020-03-13 PROCEDURE — 67028 INJECTION EYE DRUG: CPT

## 2020-03-13 ASSESSMENT — TONOMETRY: OS_IOP_MMHG: 12

## 2020-03-13 ASSESSMENT — VISUAL ACUITY: OS_SC: 20/50-2

## 2020-04-24 ENCOUNTER — CLINICAL PROCEDURE AND DIAGNOSTIC TESTING ONLY (OUTPATIENT)
Dept: URBAN - METROPOLITAN AREA CLINIC 33 | Facility: CLINIC | Age: 80
End: 2020-04-24

## 2020-04-24 DIAGNOSIS — H35.3231: ICD-10-CM

## 2020-04-24 PROCEDURE — 67028 INJECTION EYE DRUG: CPT

## 2020-04-24 PROCEDURE — 92134 CPTRZ OPH DX IMG PST SGM RTA: CPT

## 2020-04-24 ASSESSMENT — VISUAL ACUITY: OD_SC: 20/200

## 2020-04-24 ASSESSMENT — TONOMETRY: OD_IOP_MMHG: 11

## 2020-05-15 ENCOUNTER — CLINIC PROCEDURE ONLY (OUTPATIENT)
Dept: URBAN - METROPOLITAN AREA CLINIC 33 | Facility: CLINIC | Age: 80
End: 2020-05-15

## 2020-05-15 DIAGNOSIS — H35.3231: ICD-10-CM

## 2020-05-15 PROCEDURE — 67028 INJECTION EYE DRUG: CPT

## 2020-05-15 ASSESSMENT — VISUAL ACUITY: OD_SC: 20/70

## 2020-05-15 ASSESSMENT — TONOMETRY: OS_IOP_MMHG: 12

## 2020-06-12 ENCOUNTER — FOLLOW UP AND POST INJECTION EVALUATION (OUTPATIENT)
Dept: URBAN - METROPOLITAN AREA CLINIC 33 | Facility: CLINIC | Age: 80
End: 2020-06-12

## 2020-06-12 DIAGNOSIS — H43.393: ICD-10-CM

## 2020-06-12 DIAGNOSIS — H35.342: ICD-10-CM

## 2020-06-12 DIAGNOSIS — H43.811: ICD-10-CM

## 2020-06-12 DIAGNOSIS — H35.3231: ICD-10-CM

## 2020-06-12 PROCEDURE — 92134 CPTRZ OPH DX IMG PST SGM RTA: CPT

## 2020-06-12 PROCEDURE — 92250 FUNDUS PHOTOGRAPHY W/I&R: CPT

## 2020-06-12 PROCEDURE — 92014 COMPRE OPH EXAM EST PT 1/>: CPT

## 2020-06-12 PROCEDURE — 67028 INJECTION EYE DRUG: CPT

## 2020-06-12 ASSESSMENT — VISUAL ACUITY
OD_SC: 20/200-2
OS_SC: 20/60+2

## 2020-06-12 ASSESSMENT — TONOMETRY
OD_IOP_MMHG: 11
OS_IOP_MMHG: 09

## 2020-06-26 ENCOUNTER — CLINIC PROCEDURE ONLY (OUTPATIENT)
Dept: URBAN - METROPOLITAN AREA CLINIC 33 | Facility: CLINIC | Age: 80
End: 2020-06-26

## 2020-06-26 DIAGNOSIS — H35.3231: ICD-10-CM

## 2020-06-26 PROCEDURE — 67028 INJECTION EYE DRUG: CPT

## 2020-06-26 ASSESSMENT — VISUAL ACUITY: OS_SC: 20/40-2

## 2020-06-26 ASSESSMENT — TONOMETRY: OS_IOP_MMHG: 11

## 2020-07-24 ENCOUNTER — CLINICAL PROCEDURE AND DIAGNOSTIC TESTING ONLY (OUTPATIENT)
Dept: URBAN - METROPOLITAN AREA CLINIC 33 | Facility: CLINIC | Age: 80
End: 2020-07-24

## 2020-07-24 DIAGNOSIS — H35.3231: ICD-10-CM

## 2020-07-24 PROCEDURE — 67028 INJECTION EYE DRUG: CPT

## 2020-07-24 PROCEDURE — 92134 CPTRZ OPH DX IMG PST SGM RTA: CPT

## 2020-07-24 ASSESSMENT — TONOMETRY: OD_IOP_MMHG: 12

## 2020-07-24 ASSESSMENT — VISUAL ACUITY: OD_SC: 20/200-1

## 2020-07-31 ENCOUNTER — CLINICAL PROCEDURE AND DIAGNOSTIC TESTING ONLY (OUTPATIENT)
Dept: URBAN - METROPOLITAN AREA CLINIC 33 | Facility: CLINIC | Age: 80
End: 2020-07-31

## 2020-07-31 DIAGNOSIS — H35.3231: ICD-10-CM

## 2020-07-31 PROCEDURE — 67028 INJECTION EYE DRUG: CPT

## 2020-07-31 PROCEDURE — 92250 FUNDUS PHOTOGRAPHY W/I&R: CPT

## 2020-07-31 ASSESSMENT — TONOMETRY: OS_IOP_MMHG: 11

## 2020-07-31 ASSESSMENT — VISUAL ACUITY: OS_SC: 20/50

## 2020-09-04 ENCOUNTER — CLINICAL PROCEDURE AND DIAGNOSTIC TESTING ONLY (OUTPATIENT)
Dept: URBAN - METROPOLITAN AREA CLINIC 33 | Facility: CLINIC | Age: 80
End: 2020-09-04

## 2020-09-04 DIAGNOSIS — H35.3231: ICD-10-CM

## 2020-09-04 PROCEDURE — 92134 CPTRZ OPH DX IMG PST SGM RTA: CPT

## 2020-09-04 PROCEDURE — 67028 INJECTION EYE DRUG: CPT

## 2020-09-04 ASSESSMENT — VISUAL ACUITY: OD_SC: 20/200-1

## 2020-09-04 ASSESSMENT — TONOMETRY: OD_IOP_MMHG: 14

## 2020-09-18 ENCOUNTER — CLINICAL PROCEDURE AND DIAGNOSTIC TESTING ONLY (OUTPATIENT)
Dept: URBAN - METROPOLITAN AREA CLINIC 33 | Facility: CLINIC | Age: 80
End: 2020-09-18

## 2020-09-18 DIAGNOSIS — H35.3231: ICD-10-CM

## 2020-09-18 PROCEDURE — 92250 FUNDUS PHOTOGRAPHY W/I&R: CPT

## 2020-09-18 PROCEDURE — 67028 INJECTION EYE DRUG: CPT

## 2020-09-18 ASSESSMENT — VISUAL ACUITY: OS_SC: 20/50-2

## 2020-09-18 ASSESSMENT — TONOMETRY: OS_IOP_MMHG: 14

## 2020-10-16 ENCOUNTER — CLINICAL PROCEDURE AND DIAGNOSTIC TESTING ONLY (OUTPATIENT)
Dept: URBAN - METROPOLITAN AREA CLINIC 33 | Facility: CLINIC | Age: 80
End: 2020-10-16

## 2020-10-16 DIAGNOSIS — H35.3231: ICD-10-CM

## 2020-10-16 PROCEDURE — 92134 CPTRZ OPH DX IMG PST SGM RTA: CPT

## 2020-10-16 PROCEDURE — 67028 INJECTION EYE DRUG: CPT

## 2020-10-16 ASSESSMENT — TONOMETRY: OD_IOP_MMHG: 12

## 2020-10-16 ASSESSMENT — VISUAL ACUITY: OD_SC: 20/200-1

## 2020-10-23 ENCOUNTER — CLINICAL PROCEDURE AND DIAGNOSTIC TESTING ONLY (OUTPATIENT)
Dept: URBAN - METROPOLITAN AREA CLINIC 33 | Facility: CLINIC | Age: 80
End: 2020-10-23

## 2020-10-23 DIAGNOSIS — H35.3231: ICD-10-CM

## 2020-10-23 PROCEDURE — 67028 INJECTION EYE DRUG: CPT

## 2020-10-23 PROCEDURE — 92250 FUNDUS PHOTOGRAPHY W/I&R: CPT

## 2020-10-23 ASSESSMENT — VISUAL ACUITY: OS_SC: 20/50

## 2020-10-23 ASSESSMENT — TONOMETRY: OS_IOP_MMHG: 13

## 2020-11-20 ENCOUNTER — FOLLOW UP AND POST INJECTION EVALUATION (OUTPATIENT)
Dept: URBAN - METROPOLITAN AREA CLINIC 33 | Facility: CLINIC | Age: 80
End: 2020-11-20

## 2020-11-20 DIAGNOSIS — H43.811: ICD-10-CM

## 2020-11-20 DIAGNOSIS — H35.3231: ICD-10-CM

## 2020-11-20 DIAGNOSIS — H43.393: ICD-10-CM

## 2020-11-20 DIAGNOSIS — H35.342: ICD-10-CM

## 2020-11-20 PROCEDURE — 92014 COMPRE OPH EXAM EST PT 1/>: CPT

## 2020-11-20 PROCEDURE — 92250 FUNDUS PHOTOGRAPHY W/I&R: CPT

## 2020-11-20 PROCEDURE — 92134 CPTRZ OPH DX IMG PST SGM RTA: CPT

## 2020-11-20 PROCEDURE — 67028 INJECTION EYE DRUG: CPT

## 2020-11-20 ASSESSMENT — TONOMETRY
OS_IOP_MMHG: 09
OD_IOP_MMHG: 09

## 2020-11-20 ASSESSMENT — VISUAL ACUITY
OD_SC: 20/200-2
OS_SC: 20/60
OD_PH: 20/100-2

## 2020-12-04 ENCOUNTER — CLINICAL PROCEDURE AND DIAGNOSTIC TESTING ONLY (OUTPATIENT)
Dept: URBAN - METROPOLITAN AREA CLINIC 33 | Facility: CLINIC | Age: 80
End: 2020-12-04

## 2020-12-04 DIAGNOSIS — H35.3231: ICD-10-CM

## 2020-12-04 PROCEDURE — 67028 INJECTION EYE DRUG: CPT

## 2020-12-04 ASSESSMENT — VISUAL ACUITY: OS_SC: 20/60-1

## 2020-12-04 ASSESSMENT — TONOMETRY: OS_IOP_MMHG: 15

## 2020-12-31 ENCOUNTER — CLINICAL PROCEDURE AND DIAGNOSTIC TESTING ONLY (OUTPATIENT)
Dept: URBAN - METROPOLITAN AREA CLINIC 33 | Facility: CLINIC | Age: 80
End: 2020-12-31

## 2020-12-31 DIAGNOSIS — H35.3231: ICD-10-CM

## 2020-12-31 PROCEDURE — 67028 INJECTION EYE DRUG: CPT

## 2020-12-31 PROCEDURE — 92134 CPTRZ OPH DX IMG PST SGM RTA: CPT

## 2020-12-31 ASSESSMENT — TONOMETRY: OD_IOP_MMHG: 13

## 2020-12-31 ASSESSMENT — VISUAL ACUITY: OD_SC: 20/200-2

## 2021-01-08 ENCOUNTER — CLINICAL PROCEDURE AND DIAGNOSTIC TESTING ONLY (OUTPATIENT)
Dept: URBAN - METROPOLITAN AREA CLINIC 33 | Facility: CLINIC | Age: 81
End: 2021-01-08

## 2021-01-08 DIAGNOSIS — H35.3231: ICD-10-CM

## 2021-01-08 PROCEDURE — 67028 INJECTION EYE DRUG: CPT

## 2021-01-08 PROCEDURE — 92250 FUNDUS PHOTOGRAPHY W/I&R: CPT

## 2021-01-08 ASSESSMENT — TONOMETRY: OS_IOP_MMHG: 12

## 2021-01-08 ASSESSMENT — VISUAL ACUITY: OS_SC: 20/50

## 2021-02-19 ENCOUNTER — CLINICAL PROCEDURE AND DIAGNOSTIC TESTING ONLY (OUTPATIENT)
Dept: URBAN - METROPOLITAN AREA CLINIC 33 | Facility: CLINIC | Age: 81
End: 2021-02-19

## 2021-02-19 DIAGNOSIS — H35.3231: ICD-10-CM

## 2021-02-19 PROCEDURE — 92134 CPTRZ OPH DX IMG PST SGM RTA: CPT

## 2021-02-19 PROCEDURE — 67028 INJECTION EYE DRUG: CPT

## 2021-02-19 ASSESSMENT — VISUAL ACUITY: OD_SC: 20/200+2

## 2021-02-19 ASSESSMENT — TONOMETRY: OD_IOP_MMHG: 12

## 2021-03-05 ENCOUNTER — CLINICAL PROCEDURE AND DIAGNOSTIC TESTING ONLY (OUTPATIENT)
Dept: URBAN - METROPOLITAN AREA CLINIC 33 | Facility: CLINIC | Age: 81
End: 2021-03-05

## 2021-03-05 DIAGNOSIS — H35.3231: ICD-10-CM

## 2021-03-05 PROCEDURE — 92250 FUNDUS PHOTOGRAPHY W/I&R: CPT

## 2021-03-05 PROCEDURE — 67028 INJECTION EYE DRUG: CPT

## 2021-03-05 ASSESSMENT — VISUAL ACUITY: OS_SC: 20/50

## 2021-03-05 ASSESSMENT — TONOMETRY: OS_IOP_MMHG: 12

## 2021-04-02 ENCOUNTER — CLINICAL PROCEDURE AND DIAGNOSTIC TESTING ONLY (OUTPATIENT)
Dept: URBAN - METROPOLITAN AREA CLINIC 33 | Facility: CLINIC | Age: 81
End: 2021-04-02

## 2021-04-02 DIAGNOSIS — H35.3231: ICD-10-CM

## 2021-04-02 PROCEDURE — 67028 INJECTION EYE DRUG: CPT

## 2021-04-02 PROCEDURE — 92134 CPTRZ OPH DX IMG PST SGM RTA: CPT

## 2021-04-02 ASSESSMENT — VISUAL ACUITY: OD_SC: 20/100-2

## 2021-04-16 ENCOUNTER — CLINICAL PROCEDURE AND DIAGNOSTIC TESTING ONLY (OUTPATIENT)
Dept: URBAN - METROPOLITAN AREA CLINIC 33 | Facility: CLINIC | Age: 81
End: 2021-04-16

## 2021-04-16 DIAGNOSIS — H35.3231: ICD-10-CM

## 2021-04-16 PROCEDURE — 92250 FUNDUS PHOTOGRAPHY W/I&R: CPT

## 2021-04-16 PROCEDURE — 67028 INJECTION EYE DRUG: CPT

## 2021-04-16 ASSESSMENT — VISUAL ACUITY: OS_SC: 20/50+2

## 2021-04-16 ASSESSMENT — TONOMETRY: OS_IOP_MMHG: 14

## 2021-05-14 ENCOUNTER — FOLLOW UP AND POST INJECTION EVALUATION (OUTPATIENT)
Dept: URBAN - METROPOLITAN AREA CLINIC 33 | Facility: CLINIC | Age: 81
End: 2021-05-14

## 2021-05-14 VITALS — BODY MASS INDEX: 20.63 KG/M2 | WEIGHT: 133 LBS | HEIGHT: 67.5 IN

## 2021-05-14 DIAGNOSIS — H43.393: ICD-10-CM

## 2021-05-14 DIAGNOSIS — H43.811: ICD-10-CM

## 2021-05-14 DIAGNOSIS — H35.342: ICD-10-CM

## 2021-05-14 DIAGNOSIS — H35.3231: ICD-10-CM

## 2021-05-14 PROCEDURE — 92014 COMPRE OPH EXAM EST PT 1/>: CPT

## 2021-05-14 PROCEDURE — 92250 FUNDUS PHOTOGRAPHY W/I&R: CPT

## 2021-05-14 PROCEDURE — 92134 CPTRZ OPH DX IMG PST SGM RTA: CPT

## 2021-05-14 PROCEDURE — 67028 INJECTION EYE DRUG: CPT

## 2021-05-14 ASSESSMENT — TONOMETRY
OD_IOP_MMHG: 17
OS_IOP_MMHG: 16

## 2021-05-14 ASSESSMENT — VISUAL ACUITY
OD_PH: 20/100-2
OD_SC: 20/400+2
OS_SC: 20/50

## 2021-05-21 ENCOUNTER — CLINIC PROCEDURE ONLY (OUTPATIENT)
Dept: URBAN - METROPOLITAN AREA CLINIC 33 | Facility: CLINIC | Age: 81
End: 2021-05-21

## 2021-05-21 DIAGNOSIS — H35.3231: ICD-10-CM

## 2021-05-21 PROCEDURE — 67028 INJECTION EYE DRUG: CPT

## 2021-05-21 ASSESSMENT — VISUAL ACUITY: OS_SC: 20/50-2

## 2021-05-21 ASSESSMENT — TONOMETRY: OS_IOP_MMHG: 14

## 2021-06-18 ENCOUNTER — CLINICAL PROCEDURE AND DIAGNOSTIC TESTING ONLY (OUTPATIENT)
Dept: URBAN - METROPOLITAN AREA CLINIC 33 | Facility: CLINIC | Age: 81
End: 2021-06-18

## 2021-06-18 DIAGNOSIS — H35.3231: ICD-10-CM

## 2021-06-18 PROCEDURE — 67028 INJECTION EYE DRUG: CPT

## 2021-06-18 PROCEDURE — 92134 CPTRZ OPH DX IMG PST SGM RTA: CPT

## 2021-06-18 ASSESSMENT — VISUAL ACUITY
OS_SC: 20/60-1
OD_SC: 20/400

## 2021-06-18 ASSESSMENT — TONOMETRY
OD_IOP_MMHG: 13
OS_IOP_MMHG: 13

## 2021-06-25 ENCOUNTER — CLINICAL PROCEDURE AND DIAGNOSTIC TESTING ONLY (OUTPATIENT)
Dept: URBAN - METROPOLITAN AREA CLINIC 33 | Facility: CLINIC | Age: 81
End: 2021-06-25

## 2021-06-25 DIAGNOSIS — H35.3231: ICD-10-CM

## 2021-06-25 PROCEDURE — 92250 FUNDUS PHOTOGRAPHY W/I&R: CPT

## 2021-06-25 PROCEDURE — 67028 INJECTION EYE DRUG: CPT

## 2021-06-25 ASSESSMENT — TONOMETRY
OS_IOP_MMHG: 15
OD_IOP_MMHG: 13

## 2021-06-25 ASSESSMENT — VISUAL ACUITY
OS_SC: 20/50+2
OD_SC: 20/200-1

## 2021-07-30 ENCOUNTER — CLINICAL PROCEDURE AND DIAGNOSTIC TESTING ONLY (OUTPATIENT)
Dept: URBAN - METROPOLITAN AREA CLINIC 33 | Facility: CLINIC | Age: 81
End: 2021-07-30

## 2021-07-30 DIAGNOSIS — H35.3231: ICD-10-CM

## 2021-07-30 PROCEDURE — 67028 INJECTION EYE DRUG: CPT

## 2021-07-30 PROCEDURE — 92134 CPTRZ OPH DX IMG PST SGM RTA: CPT

## 2021-07-30 ASSESSMENT — TONOMETRY: OD_IOP_MMHG: 12

## 2021-07-30 ASSESSMENT — VISUAL ACUITY: OD_SC: 20/200-1

## 2021-08-06 ENCOUNTER — CLINICAL PROCEDURE AND DIAGNOSTIC TESTING ONLY (OUTPATIENT)
Dept: URBAN - METROPOLITAN AREA CLINIC 33 | Facility: CLINIC | Age: 81
End: 2021-08-06

## 2021-08-06 DIAGNOSIS — H35.3231: ICD-10-CM

## 2021-08-06 PROCEDURE — 92250 FUNDUS PHOTOGRAPHY W/I&R: CPT

## 2021-08-06 PROCEDURE — 67028 INJECTION EYE DRUG: CPT

## 2021-08-06 ASSESSMENT — TONOMETRY: OS_IOP_MMHG: 14

## 2021-08-06 ASSESSMENT — VISUAL ACUITY: OS_SC: 20/50-1

## 2021-09-10 ENCOUNTER — CLINICAL PROCEDURE AND DIAGNOSTIC TESTING ONLY (OUTPATIENT)
Dept: URBAN - METROPOLITAN AREA CLINIC 33 | Facility: CLINIC | Age: 81
End: 2021-09-10

## 2021-09-10 DIAGNOSIS — H35.3231: ICD-10-CM

## 2021-09-10 PROCEDURE — 67028 INJECTION EYE DRUG: CPT

## 2021-09-10 PROCEDURE — 92134 CPTRZ OPH DX IMG PST SGM RTA: CPT

## 2021-09-10 ASSESSMENT — TONOMETRY: OD_IOP_MMHG: 11

## 2021-09-10 ASSESSMENT — VISUAL ACUITY: OD_SC: 20/200+2

## 2021-09-17 ENCOUNTER — CLINICAL PROCEDURE AND DIAGNOSTIC TESTING ONLY (OUTPATIENT)
Dept: URBAN - METROPOLITAN AREA CLINIC 33 | Facility: CLINIC | Age: 81
End: 2021-09-17

## 2021-09-17 DIAGNOSIS — H35.3231: ICD-10-CM

## 2021-09-17 PROCEDURE — 92250 FUNDUS PHOTOGRAPHY W/I&R: CPT

## 2021-09-17 PROCEDURE — 67028 INJECTION EYE DRUG: CPT

## 2021-09-17 ASSESSMENT — VISUAL ACUITY: OS_SC: 20/50-1

## 2021-09-17 ASSESSMENT — TONOMETRY: OS_IOP_MMHG: 19

## 2021-10-15 ENCOUNTER — FOLLOW UP AND POST INJECTION EVALUATION (OUTPATIENT)
Dept: URBAN - METROPOLITAN AREA CLINIC 33 | Facility: CLINIC | Age: 81
End: 2021-10-15

## 2021-10-15 DIAGNOSIS — H35.342: ICD-10-CM

## 2021-10-15 DIAGNOSIS — H35.3221: ICD-10-CM

## 2021-10-15 DIAGNOSIS — H35.3211: ICD-10-CM

## 2021-10-15 DIAGNOSIS — H43.811: ICD-10-CM

## 2021-10-15 DIAGNOSIS — H04.123: ICD-10-CM

## 2021-10-15 DIAGNOSIS — H43.393: ICD-10-CM

## 2021-10-15 PROCEDURE — 67028 INJECTION EYE DRUG: CPT

## 2021-10-15 PROCEDURE — 92250 FUNDUS PHOTOGRAPHY W/I&R: CPT

## 2021-10-15 PROCEDURE — 92014 COMPRE OPH EXAM EST PT 1/>: CPT

## 2021-10-15 PROCEDURE — 92134 CPTRZ OPH DX IMG PST SGM RTA: CPT

## 2021-10-15 ASSESSMENT — TONOMETRY
OD_IOP_MMHG: 11
OS_IOP_MMHG: 14

## 2021-10-15 ASSESSMENT — VISUAL ACUITY
OS_SC: 20/80-2
OD_SC: 20/200

## 2021-10-22 ENCOUNTER — CLINIC PROCEDURE ONLY (OUTPATIENT)
Dept: URBAN - METROPOLITAN AREA CLINIC 33 | Facility: CLINIC | Age: 81
End: 2021-10-22

## 2021-10-22 DIAGNOSIS — H35.3221: ICD-10-CM

## 2021-10-22 PROCEDURE — 67028 INJECTION EYE DRUG: CPT

## 2021-10-22 ASSESSMENT — TONOMETRY: OS_IOP_MMHG: 12

## 2021-10-22 ASSESSMENT — VISUAL ACUITY: OS_SC: 20/50

## 2021-11-19 ENCOUNTER — CLINICAL PROCEDURE AND DIAGNOSTIC TESTING ONLY (OUTPATIENT)
Dept: URBAN - METROPOLITAN AREA CLINIC 33 | Facility: CLINIC | Age: 81
End: 2021-11-19

## 2021-11-19 DIAGNOSIS — H35.3221: ICD-10-CM

## 2021-11-19 DIAGNOSIS — H35.3211: ICD-10-CM

## 2021-11-19 PROCEDURE — 92134 CPTRZ OPH DX IMG PST SGM RTA: CPT

## 2021-11-19 PROCEDURE — 67028 INJECTION EYE DRUG: CPT

## 2021-11-19 ASSESSMENT — TONOMETRY: OD_IOP_MMHG: 14

## 2021-11-19 ASSESSMENT — VISUAL ACUITY: OD_SC: 20/200-1

## 2021-12-03 ENCOUNTER — CLINICAL PROCEDURE AND DIAGNOSTIC TESTING ONLY (OUTPATIENT)
Dept: URBAN - METROPOLITAN AREA CLINIC 33 | Facility: CLINIC | Age: 81
End: 2021-12-03

## 2021-12-03 DIAGNOSIS — H35.3211: ICD-10-CM

## 2021-12-03 DIAGNOSIS — H35.3221: ICD-10-CM

## 2021-12-03 PROCEDURE — 67028 INJECTION EYE DRUG: CPT

## 2021-12-03 PROCEDURE — 92250 FUNDUS PHOTOGRAPHY W/I&R: CPT

## 2021-12-03 ASSESSMENT — VISUAL ACUITY: OS_SC: 20/60-2

## 2021-12-03 ASSESSMENT — TONOMETRY: OS_IOP_MMHG: 11

## 2021-12-31 ENCOUNTER — CLINIC PROCEDURE ONLY (OUTPATIENT)
Dept: URBAN - METROPOLITAN AREA CLINIC 33 | Facility: CLINIC | Age: 81
End: 2021-12-31

## 2021-12-31 DIAGNOSIS — H43.811: ICD-10-CM

## 2021-12-31 DIAGNOSIS — H35.3211: ICD-10-CM

## 2021-12-31 DIAGNOSIS — H35.3221: ICD-10-CM

## 2021-12-31 PROCEDURE — 67028 INJECTION EYE DRUG: CPT

## 2021-12-31 PROCEDURE — 92134 CPTRZ OPH DX IMG PST SGM RTA: CPT

## 2021-12-31 ASSESSMENT — VISUAL ACUITY: OD_SC: 20/200-2

## 2021-12-31 ASSESSMENT — TONOMETRY: OD_IOP_MMHG: 12

## 2022-01-13 ENCOUNTER — CLINIC PROCEDURE ONLY (OUTPATIENT)
Dept: URBAN - METROPOLITAN AREA CLINIC 33 | Facility: CLINIC | Age: 82
End: 2022-01-13

## 2022-01-13 DIAGNOSIS — H35.3211: ICD-10-CM

## 2022-01-13 DIAGNOSIS — H35.3221: ICD-10-CM

## 2022-01-13 PROCEDURE — 67028 INJECTION EYE DRUG: CPT

## 2022-01-13 PROCEDURE — 92250 FUNDUS PHOTOGRAPHY W/I&R: CPT

## 2022-01-13 ASSESSMENT — VISUAL ACUITY: OS_SC: 20/60-1

## 2022-01-13 ASSESSMENT — TONOMETRY: OS_IOP_MMHG: 13

## 2022-02-08 NOTE — PATIENT DISCUSSION
Patient instructed to use warm compresses and gave rx for erythromycin ointment to use bid for a week. Also instructed to clean lids.

## 2022-02-08 NOTE — PROCEDURE NOTE: CLINICAL
PROCEDURE NOTE: Excision Chalazion, Multiple, Same Lid #1 Left Upper Lid. Diagnosis: Chalazion. Anesthesia: Subconjunctival Lidocaine. Prep: Alcohol. Prior to treatment, the risks/benefits/alternatives were discussed. The patient wished to proceed with procedure. Local anesthesia was applied and the lid was prepped. The lid was clamped exposing the posterior surface on the eyelid. Each chalazion was incised and removed with a curette. Bleeding was controlled and the clamp was removed. Patient tolerated procedure well. There were no complications. Post procedure instructions given. Viv Frazier

## 2022-02-10 ENCOUNTER — FOLLOW UP (OUTPATIENT)
Dept: URBAN - METROPOLITAN AREA CLINIC 33 | Facility: CLINIC | Age: 82
End: 2022-02-10

## 2022-02-10 DIAGNOSIS — H35.342: ICD-10-CM

## 2022-02-10 DIAGNOSIS — H43.393: ICD-10-CM

## 2022-02-10 DIAGNOSIS — H43.811: ICD-10-CM

## 2022-02-10 DIAGNOSIS — H35.3221: ICD-10-CM

## 2022-02-10 DIAGNOSIS — H04.123: ICD-10-CM

## 2022-02-10 DIAGNOSIS — H35.3211: ICD-10-CM

## 2022-02-10 PROCEDURE — 92250 FUNDUS PHOTOGRAPHY W/I&R: CPT

## 2022-02-10 PROCEDURE — 92014 COMPRE OPH EXAM EST PT 1/>: CPT

## 2022-02-10 PROCEDURE — 67028 INJECTION EYE DRUG: CPT

## 2022-02-10 PROCEDURE — 92134 CPTRZ OPH DX IMG PST SGM RTA: CPT

## 2022-02-10 ASSESSMENT — TONOMETRY
OS_IOP_MMHG: 16
OD_IOP_MMHG: 13

## 2022-02-10 ASSESSMENT — VISUAL ACUITY
OS_SC: 20/80
OD_SC: 20/200-1
OS_PH: 20/70+1

## 2022-02-17 ENCOUNTER — CLINIC PROCEDURE ONLY (OUTPATIENT)
Dept: URBAN - METROPOLITAN AREA CLINIC 33 | Facility: CLINIC | Age: 82
End: 2022-02-17

## 2022-02-17 DIAGNOSIS — H35.3221: ICD-10-CM

## 2022-02-17 PROCEDURE — 67028 INJECTION EYE DRUG: CPT

## 2022-02-17 ASSESSMENT — TONOMETRY: OS_IOP_MMHG: 10

## 2022-03-17 ENCOUNTER — CLINIC PROCEDURE ONLY (OUTPATIENT)
Dept: URBAN - METROPOLITAN AREA CLINIC 33 | Facility: CLINIC | Age: 82
End: 2022-03-17

## 2022-03-17 DIAGNOSIS — H35.3231: ICD-10-CM

## 2022-03-17 PROCEDURE — 92134 CPTRZ OPH DX IMG PST SGM RTA: CPT

## 2022-03-17 PROCEDURE — 67028 INJECTION EYE DRUG: CPT

## 2022-03-17 ASSESSMENT — TONOMETRY: OD_IOP_MMHG: 16

## 2022-03-31 ENCOUNTER — CLINIC PROCEDURE ONLY (OUTPATIENT)
Dept: URBAN - METROPOLITAN AREA CLINIC 33 | Facility: CLINIC | Age: 82
End: 2022-03-31

## 2022-03-31 DIAGNOSIS — H35.3231: ICD-10-CM

## 2022-03-31 PROCEDURE — 67028 INJECTION EYE DRUG: CPT

## 2022-03-31 PROCEDURE — 92250 FUNDUS PHOTOGRAPHY W/I&R: CPT

## 2022-03-31 ASSESSMENT — TONOMETRY: OS_IOP_MMHG: 14

## 2022-05-05 ENCOUNTER — CLINIC PROCEDURE ONLY (OUTPATIENT)
Dept: URBAN - METROPOLITAN AREA CLINIC 33 | Facility: CLINIC | Age: 82
End: 2022-05-05

## 2022-05-05 DIAGNOSIS — H35.3231: ICD-10-CM

## 2022-05-05 DIAGNOSIS — H35.342: ICD-10-CM

## 2022-05-05 PROCEDURE — 67028 INJECTION EYE DRUG: CPT

## 2022-05-05 PROCEDURE — 92134 CPTRZ OPH DX IMG PST SGM RTA: CPT

## 2022-05-05 ASSESSMENT — TONOMETRY: OD_IOP_MMHG: 13

## 2022-05-12 ENCOUNTER — CLINIC PROCEDURE ONLY (OUTPATIENT)
Dept: URBAN - METROPOLITAN AREA CLINIC 33 | Facility: CLINIC | Age: 82
End: 2022-05-12

## 2022-05-12 DIAGNOSIS — H35.3231: ICD-10-CM

## 2022-05-12 DIAGNOSIS — H35.342: ICD-10-CM

## 2022-05-12 PROCEDURE — 92250 FUNDUS PHOTOGRAPHY W/I&R: CPT

## 2022-05-12 PROCEDURE — 67028 INJECTION EYE DRUG: CPT

## 2022-05-12 ASSESSMENT — TONOMETRY: OS_IOP_MMHG: 16

## 2022-06-09 ENCOUNTER — CLINIC PROCEDURE ONLY (OUTPATIENT)
Dept: URBAN - METROPOLITAN AREA CLINIC 33 | Facility: CLINIC | Age: 82
End: 2022-06-09

## 2022-06-09 DIAGNOSIS — H35.3231: ICD-10-CM

## 2022-06-09 PROCEDURE — 67028 INJECTION EYE DRUG: CPT

## 2022-06-09 PROCEDURE — 92134 CPTRZ OPH DX IMG PST SGM RTA: CPT

## 2022-06-09 ASSESSMENT — TONOMETRY: OD_IOP_MMHG: 15

## 2022-06-13 ENCOUNTER — CLINIC PROCEDURE ONLY (OUTPATIENT)
Dept: URBAN - METROPOLITAN AREA CLINIC 33 | Facility: CLINIC | Age: 82
End: 2022-06-13

## 2022-06-13 DIAGNOSIS — H35.3231: ICD-10-CM

## 2022-06-13 PROCEDURE — 92250 FUNDUS PHOTOGRAPHY W/I&R: CPT

## 2022-06-13 PROCEDURE — 67028 INJECTION EYE DRUG: CPT

## 2022-07-14 ENCOUNTER — CLINIC PROCEDURE ONLY (OUTPATIENT)
Dept: URBAN - METROPOLITAN AREA CLINIC 33 | Facility: CLINIC | Age: 82
End: 2022-07-14

## 2022-07-14 DIAGNOSIS — H35.3231: ICD-10-CM

## 2022-07-14 DIAGNOSIS — H35.342: ICD-10-CM

## 2022-07-14 PROCEDURE — 92134 CPTRZ OPH DX IMG PST SGM RTA: CPT

## 2022-07-14 PROCEDURE — 67028 INJECTION EYE DRUG: CPT

## 2022-07-14 ASSESSMENT — TONOMETRY: OD_IOP_MMHG: 13

## 2022-07-21 ENCOUNTER — CLINIC PROCEDURE ONLY (OUTPATIENT)
Dept: URBAN - METROPOLITAN AREA CLINIC 33 | Facility: CLINIC | Age: 82
End: 2022-07-21

## 2022-07-21 DIAGNOSIS — H35.3231: ICD-10-CM

## 2022-07-21 PROCEDURE — 67028 INJECTION EYE DRUG: CPT

## 2022-07-21 PROCEDURE — 92250 FUNDUS PHOTOGRAPHY W/I&R: CPT

## 2022-07-21 ASSESSMENT — TONOMETRY: OS_IOP_MMHG: 16

## 2022-08-25 ENCOUNTER — CLINIC PROCEDURE ONLY (OUTPATIENT)
Dept: URBAN - METROPOLITAN AREA CLINIC 33 | Facility: CLINIC | Age: 82
End: 2022-08-25

## 2022-08-25 DIAGNOSIS — H35.3231: ICD-10-CM

## 2022-08-25 DIAGNOSIS — H35.342: ICD-10-CM

## 2022-08-25 PROCEDURE — 67028 INJECTION EYE DRUG: CPT

## 2022-08-25 PROCEDURE — 92134 CPTRZ OPH DX IMG PST SGM RTA: CPT

## 2022-08-25 ASSESSMENT — TONOMETRY: OD_IOP_MMHG: 13

## 2022-09-01 ENCOUNTER — CLINIC PROCEDURE ONLY (OUTPATIENT)
Dept: URBAN - METROPOLITAN AREA CLINIC 33 | Facility: CLINIC | Age: 82
End: 2022-09-01

## 2022-09-01 DIAGNOSIS — H35.342: ICD-10-CM

## 2022-09-01 DIAGNOSIS — H35.3231: ICD-10-CM

## 2022-09-01 PROCEDURE — 92250 FUNDUS PHOTOGRAPHY W/I&R: CPT

## 2022-09-01 PROCEDURE — 67028 INJECTION EYE DRUG: CPT

## 2022-09-01 ASSESSMENT — TONOMETRY: OS_IOP_MMHG: 13

## 2022-12-01 ENCOUNTER — FOLLOW UP (OUTPATIENT)
Dept: URBAN - METROPOLITAN AREA CLINIC 33 | Facility: CLINIC | Age: 82
End: 2022-12-01

## 2022-12-01 PROCEDURE — 67028 INJECTION EYE DRUG: CPT

## 2022-12-01 PROCEDURE — 92250 FUNDUS PHOTOGRAPHY W/I&R: CPT

## 2022-12-01 PROCEDURE — 92134 CPTRZ OPH DX IMG PST SGM RTA: CPT

## 2022-12-01 PROCEDURE — 92014 COMPRE OPH EXAM EST PT 1/>: CPT

## 2022-12-01 ASSESSMENT — VISUAL ACUITY
OD_SC: 20/400+1
OS_SC: 20/50-2

## 2022-12-01 ASSESSMENT — TONOMETRY
OD_IOP_MMHG: 15
OS_IOP_MMHG: 15

## 2023-01-19 ENCOUNTER — FOLLOW UP (OUTPATIENT)
Dept: URBAN - METROPOLITAN AREA CLINIC 33 | Facility: CLINIC | Age: 83
End: 2023-01-19

## 2023-01-19 VITALS — HEIGHT: 65 IN | WEIGHT: 135 LBS | BODY MASS INDEX: 22.49 KG/M2

## 2023-01-19 DIAGNOSIS — H35.3231: ICD-10-CM

## 2023-01-19 DIAGNOSIS — H35.342: ICD-10-CM

## 2023-01-19 DIAGNOSIS — H43.811: ICD-10-CM

## 2023-01-19 DIAGNOSIS — H43.393: ICD-10-CM

## 2023-01-19 DIAGNOSIS — H35.3123: ICD-10-CM

## 2023-01-19 DIAGNOSIS — H04.123: ICD-10-CM

## 2023-01-19 DIAGNOSIS — H35.3114: ICD-10-CM

## 2023-01-19 PROCEDURE — 92250 FUNDUS PHOTOGRAPHY W/I&R: CPT

## 2023-01-19 PROCEDURE — 67028 INJECTION EYE DRUG: CPT

## 2023-01-19 PROCEDURE — 92014 COMPRE OPH EXAM EST PT 1/>: CPT

## 2023-01-19 PROCEDURE — 92134 CPTRZ OPH DX IMG PST SGM RTA: CPT

## 2023-01-19 ASSESSMENT — TONOMETRY
OS_IOP_MMHG: 11
OD_IOP_MMHG: 14

## 2023-01-19 ASSESSMENT — VISUAL ACUITY
OS_SC: 20/400-1
OD_SC: 20/400-1